# Patient Record
Sex: MALE | Race: WHITE | NOT HISPANIC OR LATINO | URBAN - METROPOLITAN AREA
[De-identification: names, ages, dates, MRNs, and addresses within clinical notes are randomized per-mention and may not be internally consistent; named-entity substitution may affect disease eponyms.]

---

## 2019-06-02 ENCOUNTER — EMERGENCY (EMERGENCY)
Facility: HOSPITAL | Age: 39
LOS: 1 days | Discharge: LEFT BEFORE DISCHARGE | End: 2019-06-02
Attending: EMERGENCY MEDICINE
Payer: SELF-PAY

## 2019-06-02 VITALS
HEART RATE: 98 BPM | RESPIRATION RATE: 22 BRPM | OXYGEN SATURATION: 98 % | DIASTOLIC BLOOD PRESSURE: 84 MMHG | SYSTOLIC BLOOD PRESSURE: 124 MMHG | WEIGHT: 190.04 LBS | TEMPERATURE: 98 F | HEIGHT: 68 IN

## 2019-06-02 PROCEDURE — 99283 EMERGENCY DEPT VISIT LOW MDM: CPT

## 2019-06-02 PROCEDURE — 99282 EMERGENCY DEPT VISIT SF MDM: CPT

## 2019-06-02 NOTE — ED PROVIDER NOTE - PHYSICAL EXAMINATION
PHYSICAL EXAM:  GENERAL: restless  HEAD:  Atraumatic, Normocephalic  EYES: conjunctiva and sclera clear  NECK: Supple, No JVD  CHEST/LUNG: Clear to auscultation bilaterally; No wheeze  HEART: Regular rate and rhythm; No murmurs, rubs, or gallops  ABDOMEN: Soft, Nontender, Nondistended  EXTREMITIES: No clubbing, cyanosis, or edema  PSYCH: AAOx3  NEUROLOGY: non-focal  SKIN: No rashes or lesions

## 2019-06-02 NOTE — ED PROVIDER NOTE - ATTENDING CONTRIBUTION TO CARE
Patient presenting reporting heroin withdrawl.  Last use last night.  Uses approx 10-20 bags per day for last 6 months, snorting does not inject.  Denying other drugs or EtOH.  Reporting feeling anxious, "jumping out of my skin".  Requesting detox.    A 14 point review of systems is negative except as in HPI or otherwise documented.    Exam:  General: Patient well appearing, vital signs within normal limits  HEENT: airway patent with moist mucous membranes  Cardiac: RRR S1/S2 with strong peripheral pulses  Respiratory: lungs clear without respiratory distress  GI: abdomen soft, non tender, non distended  Neuro: no gross neurologic deficits  Skin: warm, well perfused, no noted piloerection  Psych: anxious/jittery    Patient presenting complaining of heroin withdrawl, last use last night.  Discussed with patient cannot start suboxone/methadone from Emergency Department as I do not have necessary [employer] license for these medications.  Offered symptomatic management with benzos/clonidine/bentyl which he refused.  Offered social work consultation for referral to outpatient/inpatient detox facilities which he agreed to but then eloped from exam room under his own power prior to social work evaluation.

## 2019-06-02 NOTE — ED PROVIDER NOTE - NS ED ROS FT
REVIEW OF SYSTEMS:    CONSTITUTIONAL: restlessness  EYES/ENT: No visual changes;  No vertigo or throat pain   NECK: No pain or stiffness  RESPIRATORY: No cough, wheezing, hemoptysis; No shortness of breath  CARDIOVASCULAR: No chest pain or palpitations  GASTROINTESTINAL: No diarrhea  GENITOURINARY: No dysuria, frequency or hematuria  NEUROLOGICAL: No numbness or weakness  SKIN: itching

## 2019-06-02 NOTE — ED PROVIDER NOTE - OBJECTIVE STATEMENT
37yo with hx heroin use presents with withdrawal symptoms.  He last snorted heroin yesterday.  He currently feels very restless and has very bad chills.  He has been using heroin for the last 6 months. He denies IV use.  He does not use other drugs, no alcohol.  He says he wants to quit.